# Patient Record
Sex: MALE | Race: WHITE | NOT HISPANIC OR LATINO | Employment: FULL TIME | URBAN - METROPOLITAN AREA
[De-identification: names, ages, dates, MRNs, and addresses within clinical notes are randomized per-mention and may not be internally consistent; named-entity substitution may affect disease eponyms.]

---

## 2024-02-29 ENCOUNTER — OFFICE VISIT (OUTPATIENT)
Dept: PRIMARY CARE | Facility: HOSPITAL | Age: 39
End: 2024-02-29
Payer: COMMERCIAL

## 2024-02-29 VITALS
OXYGEN SATURATION: 96 % | WEIGHT: 214.9 LBS | HEART RATE: 64 BPM | HEIGHT: 74 IN | SYSTOLIC BLOOD PRESSURE: 120 MMHG | DIASTOLIC BLOOD PRESSURE: 80 MMHG | BODY MASS INDEX: 27.58 KG/M2 | TEMPERATURE: 97.9 F

## 2024-02-29 DIAGNOSIS — Z80.42 FAMILY HISTORY OF PROSTATE CANCER: ICD-10-CM

## 2024-02-29 DIAGNOSIS — Z00.00 ROUTINE HEALTH MAINTENANCE: Primary | ICD-10-CM

## 2024-02-29 DIAGNOSIS — D22.9 BENIGN MOLE: ICD-10-CM

## 2024-02-29 PROCEDURE — 1036F TOBACCO NON-USER: CPT | Performed by: INTERNAL MEDICINE

## 2024-02-29 PROCEDURE — 3008F BODY MASS INDEX DOCD: CPT | Performed by: INTERNAL MEDICINE

## 2024-02-29 PROCEDURE — 99396 PREV VISIT EST AGE 40-64: CPT | Performed by: INTERNAL MEDICINE

## 2024-02-29 RX ORDER — LORATADINE 10 MG/1
10 TABLET ORAL DAILY
COMMUNITY

## 2024-02-29 ASSESSMENT — ENCOUNTER SYMPTOMS
DEPRESSION: 0
LOSS OF SENSATION IN FEET: 0
OCCASIONAL FEELINGS OF UNSTEADINESS: 0

## 2024-02-29 NOTE — PATIENT INSTRUCTIONS
Fasting blood work     Follow up with dermatology     Exercise for 30 min 5d per week     Do not sit for more than one hour at a time    Plant based,whole food diet     5 servings of fruits per day, 35 grams of fiber per day     Avoid - red meat, processed meat, saturated fat, sugar     Movies:  The Game Changers  What the Health  Grove [employer]  Blue Zones     Web Sites/Recipes:  Blue Zones  Gro[place] Over [employer]  Plant Layer[employer] TV   Nutritionfacts.org     Authors:  Dr. Abundio Norton     Cookbooks:  The Get Healthy, Go Vegan Cookbook: 125 Easy and Delicious Recipes to Jump-Start Weight Loss and Help you Feel Great - Dr. Abundio Guzman's Cookbook for Reversing Diabetes:  150 Recipes Scientifically Proven to Reverse Diabetes Without Drugs - Dr. Abundio Guzman  The Prevent and Reverse Heart Disease Cookbook - Dr. German Espinoza   The [place] Study All-Star Collection:  Whole Food, Plant-Based Recipes from Your Favorite Vegan  - Dr. FRENCH Polanco  How Not To Die - Dr. Chandler Gonzalez

## 2024-02-29 NOTE — PROGRESS NOTES
Chief Complaint   Patient presents with    Establish Care         History Of Present Illness:    Tobias Pelaez is a 38 y.o. male presenting establish care, update health maintenance and arrange dermatology referral.  Tobias has not seen her doctor recently.  He is in his usual state of health but interested in updating health maintenance.  He has a couple of moles he like to have checked by the dermatologist.  Feeling well in general.  Mood and energy level are good.  He denies headaches dizziness or vision changes.  He endorses good exercise tolerance and denies chest pain or shortness of breath with exertion.  He denies any change in bowel or bladder habits.  Erections are normal.  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  He denies other joint pain or red or swollen joints.      Active Medical Problems:  Patient Active Problem List    Diagnosis Date Noted    Mitral valve prolapse 03/01/2024    Seasonal allergies 03/01/2024    Routine health maintenance 03/01/2024       Past Medical History:  Past Medical History:   Diagnosis Date    Mitral valve prolapse     discovered in childhood,no progression on serial echo and no murmur as an adult    Seasonal allergies        Past Surgical History:  Past Surgical History:   Procedure Laterality Date    WISDOM TOOTH EXTRACTION           Social History:  Social History     Tobacco Use    Smoking status: Never    Smokeless tobacco: Never   Vaping Use    Vaping Use: Never used   Substance Use Topics    Alcohol use: Yes     Alcohol/week: 10.0 standard drinks of alcohol     Types: 10 Standard drinks or equivalent per week     Comment: 1 drink most days - cocktail or wine    Drug use: Never         Family History:  Family History   Problem Relation Name Age of Onset    No Known Problems Mother      Skin cancer Father          melanoma    Prostate cancer Father          Dx age 60    No Known Problems Brother haf     Epilepsy Daughter    "   No Known Problems Daughter          Allergies:  Penicillins    Outpatient Medications:    Current Outpatient Medications:     loratadine (Claritin) 10 mg tablet, Take 1 tablet (10 mg) by mouth once daily., Disp: , Rfl:     PROTEIN SUPPLEMENT ORAL, Take by mouth., Disp: , Rfl:     Review of Systems:  Pertinent positives in review of systems outlined above.  Complete ROS otherwise negative.        Last Recorded Vitals:  Vitals:    02/29/24 1458 02/29/24 1530   BP: (!) 128/93 120/80   BP Location: Left arm    Patient Position: Sitting    BP Cuff Size: Large adult    Pulse: 64    Temp: 36.6 °C (97.9 °F)    SpO2: 96%    Weight: 97.5 kg (214 lb 14.4 oz)    Height: 1.88 m (6' 2\")    Body mass index is 27.59 kg/m².        Physical Exam  Vitals reviewed.   Constitutional:       Appearance: Normal appearance.   HENT:      Mouth/Throat:      Pharynx: Oropharynx is clear.   Eyes:      Extraocular Movements: Extraocular movements intact.      Conjunctiva/sclera: Conjunctivae normal.      Pupils: Pupils are equal, round, and reactive to light.   Neck:      Vascular: No carotid bruit.   Cardiovascular:      Rate and Rhythm: Normal rate and regular rhythm.   Pulmonary:      Effort: Pulmonary effort is normal.      Breath sounds: Normal breath sounds.   Abdominal:      General: Abdomen is flat. Bowel sounds are normal.      Palpations: Abdomen is soft. There is no mass.      Tenderness: There is no abdominal tenderness. There is no right CVA tenderness or left CVA tenderness.   Musculoskeletal:      Cervical back: No tenderness.      Right lower leg: No edema.      Left lower leg: No edema.   Lymphadenopathy:      Cervical: No cervical adenopathy.   Neurological:      General: No focal deficit present.      Mental Status: He is alert and oriented to person, place, and time.   Psychiatric:         Mood and Affect: Mood normal.          Assessment/Plan   Problem List Items Addressed This Visit       Routine health maintenance     " Pressure is in a good range, fasting lipid profile and fasting blood sugar will be obtained now.  A baseline PSA will be included with his blood work.  Colon cancer screening to begin at age 45.  Referral to dermatology for routine mole check placed.  Charge.  Tdap up-to-date.  Advised on testicular self-examination.    We discussed that he can improve his overall health and lower cardiovascular risk by making healthy lifestyle changes including exercising for 30 minutes 5 days/week, eating a plant-based whole food diet, getting 7 hours of restful sleep and managing stress.  Online resources to help with meal planning.          Other Visit Diagnoses       Benign mole    -  Primary    Relevant Orders    Referral to Dermatology    Comprehensive Metabolic Panel (Completed)    Family history of prostate cancer        Relevant Orders    Comprehensive Metabolic Panel (Completed)    Prostate Specific Antigen    BMI 27.0-27.9,adult        Relevant Orders    Comprehensive Metabolic Panel (Completed)    Lipid Panel (Completed)                  Arcadio Nice MD

## 2024-03-01 ENCOUNTER — LAB (OUTPATIENT)
Dept: LAB | Facility: LAB | Age: 39
End: 2024-03-01
Payer: COMMERCIAL

## 2024-03-01 DIAGNOSIS — E78.2 MIXED DYSLIPIDEMIA: Primary | ICD-10-CM

## 2024-03-01 DIAGNOSIS — Z80.42 FAMILY HISTORY OF PROSTATE CANCER: ICD-10-CM

## 2024-03-01 DIAGNOSIS — D22.9 BENIGN MOLE: ICD-10-CM

## 2024-03-01 PROBLEM — Z00.00 ROUTINE HEALTH MAINTENANCE: Status: ACTIVE | Noted: 2024-03-01

## 2024-03-01 PROBLEM — J30.2 SEASONAL ALLERGIES: Status: ACTIVE | Noted: 2024-03-01

## 2024-03-01 PROBLEM — I34.1 MITRAL VALVE PROLAPSE: Status: ACTIVE | Noted: 2024-03-01

## 2024-03-01 LAB
ALBUMIN SERPL BCP-MCNC: 4.5 G/DL (ref 3.4–5)
ALP SERPL-CCNC: 52 U/L (ref 33–120)
ALT SERPL W P-5'-P-CCNC: 33 U/L (ref 10–52)
ANION GAP SERPL CALC-SCNC: 15 MMOL/L (ref 10–20)
AST SERPL W P-5'-P-CCNC: 23 U/L (ref 9–39)
BILIRUB SERPL-MCNC: 0.8 MG/DL (ref 0–1.2)
BUN SERPL-MCNC: 20 MG/DL (ref 6–23)
CALCIUM SERPL-MCNC: 9.3 MG/DL (ref 8.6–10.3)
CHLORIDE SERPL-SCNC: 101 MMOL/L (ref 98–107)
CHOLEST SERPL-MCNC: 236 MG/DL (ref 0–199)
CHOLESTEROL/HDL RATIO: 5.1
CO2 SERPL-SCNC: 28 MMOL/L (ref 21–32)
CREAT SERPL-MCNC: 1.03 MG/DL (ref 0.5–1.3)
EGFRCR SERPLBLD CKD-EPI 2021: >90 ML/MIN/1.73M*2
GLUCOSE SERPL-MCNC: 82 MG/DL (ref 74–99)
HDLC SERPL-MCNC: 46 MG/DL
LDLC SERPL CALC-MCNC: 159 MG/DL
NON HDL CHOLESTEROL: 190 MG/DL (ref 0–149)
POTASSIUM SERPL-SCNC: 4.8 MMOL/L (ref 3.5–5.3)
PROT SERPL-MCNC: 7.5 G/DL (ref 6.4–8.2)
PSA SERPL-MCNC: 0.79 NG/ML
SODIUM SERPL-SCNC: 139 MMOL/L (ref 136–145)
TRIGL SERPL-MCNC: 156 MG/DL (ref 0–149)
VLDL: 31 MG/DL (ref 0–40)

## 2024-03-01 PROCEDURE — 80053 COMPREHEN METABOLIC PANEL: CPT

## 2024-03-01 PROCEDURE — 80061 LIPID PANEL: CPT

## 2024-03-01 PROCEDURE — 84153 ASSAY OF PSA TOTAL: CPT

## 2024-03-01 PROCEDURE — 36415 COLL VENOUS BLD VENIPUNCTURE: CPT

## 2024-03-01 NOTE — ASSESSMENT & PLAN NOTE
Pressure is in a good range, fasting lipid profile and fasting blood sugar will be obtained now.  A baseline PSA will be included with his blood work.  Colon cancer screening to begin at age 43.  Referral to dermatology for routine mole check placed.  Charge.  Tdap up-to-date.  Advised on testicular self-examination.    We discussed that he can improve his overall health and lower cardiovascular risk by making healthy lifestyle changes including exercising for 30 minutes 5 days/week, eating a plant-based whole food diet, getting 7 hours of restful sleep and managing stress.  Online resources to help with meal planning.

## 2024-03-08 ENCOUNTER — OFFICE VISIT (OUTPATIENT)
Dept: DERMATOLOGY | Facility: CLINIC | Age: 39
End: 2024-03-08
Payer: COMMERCIAL

## 2024-03-08 DIAGNOSIS — D22.5 MELANOCYTIC NEVUS OF TRUNK: ICD-10-CM

## 2024-03-08 DIAGNOSIS — L81.4 LENTIGO: ICD-10-CM

## 2024-03-08 DIAGNOSIS — D18.01 HEMANGIOMA OF SKIN: ICD-10-CM

## 2024-03-08 DIAGNOSIS — D48.5 NEOPLASM OF UNCERTAIN BEHAVIOR OF SKIN: Primary | ICD-10-CM

## 2024-03-08 DIAGNOSIS — L57.8 DIFFUSE PHOTODAMAGE OF SKIN: ICD-10-CM

## 2024-03-08 DIAGNOSIS — L21.9 SEBORRHEIC DERMATITIS: ICD-10-CM

## 2024-03-08 PROCEDURE — 11306 SHAVE SKIN LESION 0.6-1.0 CM: CPT | Performed by: DERMATOLOGY

## 2024-03-08 PROCEDURE — 11301 SHAVE SKIN LESION 0.6-1.0 CM: CPT | Performed by: DERMATOLOGY

## 2024-03-08 PROCEDURE — 3008F BODY MASS INDEX DOCD: CPT | Performed by: DERMATOLOGY

## 2024-03-08 PROCEDURE — 88305 TISSUE EXAM BY PATHOLOGIST: CPT | Performed by: DERMATOLOGY

## 2024-03-08 PROCEDURE — 99204 OFFICE O/P NEW MOD 45 MIN: CPT | Performed by: DERMATOLOGY

## 2024-03-08 PROCEDURE — 1036F TOBACCO NON-USER: CPT | Performed by: DERMATOLOGY

## 2024-03-08 ASSESSMENT — ITCH NUMERIC RATING SCALE: HOW SEVERE IS YOUR ITCHING?: 0

## 2024-03-08 ASSESSMENT — DERMATOLOGY PATIENT ASSESSMENT
DO YOU HAVE ANY NEW OR CHANGING LESIONS: NO
DO YOU USE SUNSCREEN: OCCASIONALLY
DO YOU USE A TANNING BED: NO
ARE YOU AN ORGAN TRANSPLANT RECIPIENT: NO

## 2024-03-08 ASSESSMENT — DERMATOLOGY QUALITY OF LIFE (QOL) ASSESSMENT: ARE THERE EXCLUSIONS OR EXCEPTIONS FOR THE QUALITY OF LIFE ASSESSMENT: NO

## 2024-03-09 RX ORDER — KETOCONAZOLE 20 MG/G
CREAM TOPICAL
Qty: 60 G | Refills: 11 | Status: SHIPPED | OUTPATIENT
Start: 2024-03-09

## 2024-03-10 NOTE — PROGRESS NOTES
Subjective     Tobias Pelaez is a 38 y.o. male who presents for the following: Skin Check.  He states he has not noticed any changes in any of his brown spots recently, including in size, shape, or color, and they are all asymptomatic with no associated bleeding, itching, or pain.  He notes dry, scaly areas on his face, especially around his nose and on his inner cheeks.      Review of Systems:  No other skin or systemic complaints other than what is documented elsewhere in the note.    The following portions of the chart were reviewed this encounter and updated as appropriate:       Skin Cancer History  No skin cancer on file.    Specialty Problems    None      Past Dermatologic / Past Relevant Medical History:    No history of atypical nevi or skin cancer    Family History:    Father - skin cancer of unknown type    Social History:    The patient states he is originally from [facility] and went to [employer] for undergraduate school and then [employer] for law school and now works as a     Allergies:  Penicillins    Current Medications / CAM's:    Current Outpatient Medications:     loratadine (Claritin) 10 mg tablet, Take 1 tablet (10 mg) by mouth once daily., Disp: , Rfl:     PROTEIN SUPPLEMENT ORAL, Take by mouth., Disp: , Rfl:     ketoconazole (NIZOral) 2 % cream, Apply twice daily to affected areas of face, Disp: 60 g, Rfl: 11     Objective   Well appearing patient in no apparent distress; mood and affect are within normal limits.    A full examination was performed including scalp, face, eyes, ears, nose, lips, neck, chest, axillae, abdomen, back, bilateral upper extremities, and bilateral lower extremities. All findings within normal limits unless otherwise noted below.    Assessment/Plan   1. Neoplasm of uncertain behavior of skin (2)  Right Lateral Posterior Neck  6 mm dark brown pigmented, asymmetric macule with an asymmetric pigment network and irregular borders           Shave  removal    Lesion length (cm):  0.6  Margin per side (cm):  0.2  Lesion diameter (cm):  1  Informed consent: discussed and consent obtained    Timeout: patient name, date of birth, surgical site, and procedure verified    Procedure prep:  Patient was prepped and draped  Anesthesia: the lesion was anesthetized in a standard fashion    Anesthetic:  1% lidocaine w/ epinephrine 1-100,000 local infiltration  Instrument used: flexible razor blade    Hemostasis achieved with: aluminum chloride    Outcome: patient tolerated procedure well    Post-procedure details: sterile dressing applied and wound care instructions given    Dressing type: bandage and petrolatum      Staff Communication: Dermatology Local Anesthesia: 1 % Lidocaine / Epinephrine - Amount:0.5ml    Specimen 1 - Dermatopathology- DERM LAB  Differential Diagnosis: DN  Check Margins Yes/No?:    Comments:    Dermpath Lab: Routine Histopathology (formalin-fixed tissue)    Left Posterior Shoulder  6 mm dark brown pigmented, asymmetric macule with an asymmetric pigment network and irregular borders           Shave removal    Lesion length (cm):  0.6  Margin per side (cm):  0.2  Lesion diameter (cm):  1  Informed consent: discussed and consent obtained    Timeout: patient name, date of birth, surgical site, and procedure verified    Procedure prep:  Patient was prepped and draped  Anesthesia: the lesion was anesthetized in a standard fashion    Anesthetic:  1% lidocaine w/ epinephrine 1-100,000 local infiltration  Instrument used: flexible razor blade    Hemostasis achieved with: aluminum chloride    Outcome: patient tolerated procedure well    Post-procedure details: sterile dressing applied and wound care instructions given    Dressing type: bandage and petrolatum      Staff Communication: Dermatology Local Anesthesia: 1 % Lidocaine / Epinephrine - Amount:0.5ml    Specimen 2 - Dermatopathology- DERM LAB  Differential Diagnosis: DN  Check Margins Yes/No?:     Comments:    Dermpath Lab: Routine Histopathology (formalin-fixed tissue)    2. Melanocytic nevus of trunk  Scattered on the patient's face, neck, trunk, and extremities, there are multiple small, round- to oval-shaped, brown-pigmented and pink-colored, symmetric, uniform-appearing macules and dome-shaped papules    Clinically benign- to slightly atypical-appearing nevi - the clinically benign- to slightly atypical-appearing nature of the remainder of the patient's nevi was discussed with the patient today.  None of the patient's nevi, with the exception of the 2 noted above, meet threshold for biopsy today.  I emphasized the importance of performing monthly self-skin exams using the ABCDs of monitoring moles, which were reviewed with the patient today and an informational hand-out provided.  I also emphasized the importance of sun avoidance and sun protection with daily sunscreen use.    3. Lentigo  Photodistributed  Multiple tan- to light brown-colored, round- to oval-shaped, symmetric and uniform-appearing macules and small patches consistent with lentigines scattered in sun-exposed areas.    Solar Lentigines and photodamage.  The clinically benign-appearing nature of these lesions and their relation to chronic sun exposure were discussed with the patient today and reassurance provided.  None of these lesions meet threshold for biopsy today, and thus no treatment is medically indicated for these lesions at this time.  The signs and symptoms of skin cancer were reviewed and the patient was advised to practice sun protection and sun avoidance, use daily sunscreen, and perform regular self skin exams.  The patient was instructed to monitor these lesions for any changes, such as in size, shape, or color, or associated symptoms and to call our office to schedule a return visit for re-evaluation if any such changes or symptoms are noticed in the future.  The patient expressed understanding and is in agreement with  this plan.    4. Hemangioma of skin  Scattered on the patient's face, neck, trunk, and extremities, there are multiple small, round, cherry red- to purplish-colored, symmetric, uniform, vascular-appearing macules and papules    Cherry Angiomas - the benign nature of these vascular lesions was discussed with the patient today and reassurance provided.  No treatment is medically indicated for these lesions at this time.    5. Seborrheic dermatitis  Head - Anterior (Face)  On the patient's face, mainly the glabella and bilateral eyebrows and perinasal creases, there are pink, scaly patches with whitish-yellowish, greasy scale    Seborrheic Dermatitis - face.  The potentially chronic and intermittently flaring nature of this condition and treatment options were discussed extensively with the patient today.  At this time, I recommend topical anti-fungal therapy with Ketoconazole 2% cream, which the patient was instructed to apply twice daily to the affected areas of the face.  The risks, benefits, and side effects of this medication were discussed.  The patient expressed understanding and is in agreement with this plan.    ketoconazole (NIZOral) 2 % cream - Head - Anterior (Face)  Apply twice daily to affected areas of face    6. Diffuse photodamage of skin  Photodistributed  Diffuse photodamage with actinic changes with telangiectasia and mottled pigmentation in sun-exposed areas.    Photodamage.  The signs and symptoms of skin cancer were reviewed and the patient was advised to practice sun protection and sun avoidance, use daily sunscreen, and perform regular self skin exams.  Sun protection was discussed, including avoiding the mid-day sun, wearing a sunscreen with SPF at least 50, and stressing the need for reapplication of sunscreen and applying more than they think they need.

## 2024-03-12 LAB
LABORATORY COMMENT REPORT: NORMAL
PATH REPORT.FINAL DX SPEC: NORMAL
PATH REPORT.GROSS SPEC: NORMAL
PATH REPORT.MICROSCOPIC SPEC OTHER STN: NORMAL
PATH REPORT.RELEVANT HX SPEC: NORMAL
PATH REPORT.TOTAL CANCER: NORMAL

## 2024-07-09 ENCOUNTER — OFFICE VISIT (OUTPATIENT)
Dept: PRIMARY CARE | Facility: HOSPITAL | Age: 39
End: 2024-07-09
Payer: COMMERCIAL

## 2024-07-09 ENCOUNTER — APPOINTMENT (OUTPATIENT)
Dept: PRIMARY CARE | Facility: HOSPITAL | Age: 39
End: 2024-07-09
Payer: COMMERCIAL

## 2024-07-09 VITALS
HEART RATE: 50 BPM | TEMPERATURE: 97.2 F | OXYGEN SATURATION: 98 % | SYSTOLIC BLOOD PRESSURE: 133 MMHG | DIASTOLIC BLOOD PRESSURE: 88 MMHG | WEIGHT: 194.9 LBS | BODY MASS INDEX: 25.02 KG/M2

## 2024-07-09 DIAGNOSIS — R19.09 LUMP IN THE GROIN: Primary | ICD-10-CM

## 2024-07-09 PROCEDURE — 3008F BODY MASS INDEX DOCD: CPT | Performed by: INTERNAL MEDICINE

## 2024-07-09 PROCEDURE — 1036F TOBACCO NON-USER: CPT | Performed by: INTERNAL MEDICINE

## 2024-07-09 PROCEDURE — 99213 OFFICE O/P EST LOW 20 MIN: CPT | Performed by: INTERNAL MEDICINE

## 2024-07-09 NOTE — ASSESSMENT & PLAN NOTE
Palpable lump in left groin is consistent with a reactive lymph node versus small lipoma.  There is no other adenopathy in the left groin, there is no adenopathy in the right groin, there is no axillary or cervical adenopathy on exam.  There is no splenomegaly on exam.  He has no constitutional symptoms.  An ultrasound of the left groin will be arranged to differentiate lipoma from reactive lymph node.  Tobias will call if the lump increases in size, becomes painful or if he develops constitutional symptoms.

## 2024-07-09 NOTE — PATIENT INSTRUCTIONS
Schedule ultrasound of pelvis     Call for increased size, pain, swelling in other parts of the body or fevers/chills/sweats

## 2024-07-09 NOTE — PROGRESS NOTES
Chief Complaint:   Follow-up     History Of Present Illness:    Tobias Pelaez is a 38 y.o. male with active medical problems outlined below who presents for evaluation and management of a lump in left groin.    Tobias discovered a lump in his left groin incidentally while showering over the past couple of months.  He describes it as in the fold between his leg and his torso.  Is more prominent when he stands and less prominent when he lies back.  It is not painful.  There is no drainage.  He denies any new urinary symptoms.  He denies any new skin lesions on his penis scrotum or groin.  He denies swelling elsewhere in his body.  He denies fevers chills or sweats at night.  He is otherwise in his usual state of health.       Patient Active Problem List   Diagnosis    Mitral valve prolapse    Seasonal allergies    Routine health maintenance    Lump in the groin       Current Outpatient Medications:     ketoconazole (NIZOral) 2 % cream, Apply twice daily to affected areas of face, Disp: 60 g, Rfl: 11    loratadine (Claritin) 10 mg tablet, Take 1 tablet (10 mg) by mouth once daily., Disp: , Rfl:     PROTEIN SUPPLEMENT ORAL, Take by mouth., Disp: , Rfl:   Penicillins  Social History     Tobacco Use    Smoking status: Never    Smokeless tobacco: Never   Vaping Use    Vaping status: Never Used   Substance Use Topics    Alcohol use: Yes     Alcohol/week: 10.0 standard drinks of alcohol     Types: 10 Standard drinks or equivalent per week     Comment: 1 drink most days - cocktail or wine    Drug use: Never         All pertinent aspects of medical and surgical history were reviewed and updated in chart    Review of Systems   Pertinent positives in review of systems outlined above.  Complete ROS otherwise negative.        Last Recorded Vitals:  Patient Vitals for the past 24 hrs:   BP Temp Pulse SpO2 Weight   07/09/24 0821 133/88 36.2 °C (97.2 °F) 50 98 % 88.4 kg (194 lb 14.4 oz)          Physical Exam  HENT:       Mouth/Throat:      Pharynx: Oropharynx is clear.   Eyes:      Extraocular Movements: Extraocular movements intact.      Conjunctiva/sclera: Conjunctivae normal.      Pupils: Pupils are equal, round, and reactive to light.   Cardiovascular:      Rate and Rhythm: Normal rate and regular rhythm.   Abdominal:      General: Abdomen is flat.      Palpations: There is no mass.      Hernia: No hernia is present.   Genitourinary:     Comments: 3 to 4 cm palpable nodule on left groin.  Nodule is well-circumscribed, soft and mobile.  No overlying erythema.  No central fluctuance.  No inguinal hernia present.  No swelling in the right groin.  Musculoskeletal:      Comments: No adenopathy in right or left axilla.          The 10-year ASCVD risk score (Lukasz DK, et al., 2019) is: 1.9%    Values used to calculate the score:      Age: 38 years      Sex: Male      Is Non- : No      Diabetic: No      Tobacco smoker: No      Systolic Blood Pressure: 133 mmHg      Is BP treated: No      HDL Cholesterol: 46 mg/dL      Total Cholesterol: 236 mg/dL      Assessment/Plan   Problem List Items Addressed This Visit       Lump in the groin - Primary     Palpable lump in left groin is consistent with a reactive lymph node versus small lipoma.  There is no other adenopathy in the left groin, there is no adenopathy in the right groin, there is no axillary or cervical adenopathy on exam.  There is no splenomegaly on exam.  He has no constitutional symptoms.  An ultrasound of the left groin will be arranged to differentiate lipoma from reactive lymph node.  Tobias will call if the lump increases in size, becomes painful or if he develops constitutional symptoms.         Relevant Orders    US pelvis limited       A total of 20 minutes was spent reviewing the chart and recent testing and discussing plan of care.         Arcadio Nice MD

## 2024-07-10 ENCOUNTER — HOSPITAL ENCOUNTER (OUTPATIENT)
Dept: RADIOLOGY | Facility: HOSPITAL | Age: 39
Discharge: HOME | End: 2024-07-10
Payer: COMMERCIAL

## 2024-07-10 DIAGNOSIS — I72.4 PSEUDOANEURYSM OF FEMORAL ARTERY (CMS-HCC): Primary | ICD-10-CM

## 2024-07-10 DIAGNOSIS — R19.09 LUMP IN THE GROIN: ICD-10-CM

## 2024-07-10 PROCEDURE — 76857 US EXAM PELVIC LIMITED: CPT

## 2024-07-10 NOTE — PROGRESS NOTES
US results reviewed with radiology.  CTA ordered.  I spoke with John and advised ER eval for increase in size of swelling, pain in leg or back or if foot becomes numb, cold , blue.  To follow up with vascular surgery.  Referral placed.

## 2024-07-11 ENCOUNTER — HOSPITAL ENCOUNTER (OUTPATIENT)
Dept: RADIOLOGY | Facility: HOSPITAL | Age: 39
Discharge: HOME | End: 2024-07-11
Payer: COMMERCIAL

## 2024-07-11 DIAGNOSIS — I72.4 PSEUDOANEURYSM OF FEMORAL ARTERY (CMS-HCC): ICD-10-CM

## 2024-07-11 LAB
CREAT SERPL-MCNC: 0.46 MG/DL (ref 0.6–1.3)
GFR SERPL CREATININE-BSD FRML MDRD: >90 ML/MIN/1.73M*2

## 2024-07-11 PROCEDURE — 2550000001 HC RX 255 CONTRASTS: Performed by: INTERNAL MEDICINE

## 2024-07-11 PROCEDURE — 82565 ASSAY OF CREATININE: CPT

## 2024-07-11 PROCEDURE — 72191 CT ANGIOGRAPH PELV W/O&W/DYE: CPT | Mod: 50

## 2024-07-11 PROCEDURE — 73706 CT ANGIO LWR EXTR W/O&W/DYE: CPT | Mod: 50,LT

## 2024-07-16 ENCOUNTER — TELEPHONE (OUTPATIENT)
Dept: PRIMARY CARE | Facility: HOSPITAL | Age: 39
End: 2024-07-16
Payer: COMMERCIAL

## 2024-07-16 DIAGNOSIS — I72.9 PSEUDOANEURYSM (CMS-HCC): Primary | ICD-10-CM

## 2024-07-16 NOTE — TELEPHONE ENCOUNTER
I reviewed recent ultrasound and CT angiogram results with vascular.  Dr. Jessica Lal reviewed the studies and suspects that there is a pseudoaneurysm that has clotted.  She recommends an arterial Doppler done in the vascular lab specifically to rule out pseudoaneurysm.  I placed a stat order, and I spoke with Tobias about scheduling the test.  I advised him to limit physical exertion until his follow-up study has been reviewed.  He is not having pain in his groin, and he has not experienced any increase in the size of the swelling.

## 2024-07-17 ENCOUNTER — HOSPITAL ENCOUNTER (OUTPATIENT)
Dept: VASCULAR MEDICINE | Facility: HOSPITAL | Age: 39
Discharge: HOME | End: 2024-07-17
Payer: COMMERCIAL

## 2024-07-17 DIAGNOSIS — I72.9 PSEUDOANEURYSM (CMS-HCC): ICD-10-CM

## 2024-07-17 DIAGNOSIS — I73.9 PERIPHERAL VASCULAR DISEASE, UNSPECIFIED (CMS-HCC): ICD-10-CM

## 2024-07-17 PROCEDURE — 93926 LOWER EXTREMITY STUDY: CPT | Performed by: INTERNAL MEDICINE

## 2024-07-17 PROCEDURE — 93926 LOWER EXTREMITY STUDY: CPT

## 2024-07-19 ENCOUNTER — PATIENT MESSAGE (OUTPATIENT)
Dept: PRIMARY CARE | Facility: CLINIC | Age: 39
End: 2024-07-19
Payer: COMMERCIAL

## 2024-07-22 ENCOUNTER — APPOINTMENT (OUTPATIENT)
Dept: VASCULAR SURGERY | Facility: CLINIC | Age: 39
End: 2024-07-22
Payer: COMMERCIAL

## 2024-07-22 VITALS
OXYGEN SATURATION: 96 % | BODY MASS INDEX: 24.13 KG/M2 | WEIGHT: 188 LBS | HEIGHT: 74 IN | HEART RATE: 60 BPM | DIASTOLIC BLOOD PRESSURE: 66 MMHG | SYSTOLIC BLOOD PRESSURE: 118 MMHG

## 2024-07-22 DIAGNOSIS — I87.2 VENOUS INSUFFICIENCY (CHRONIC) (PERIPHERAL): ICD-10-CM

## 2024-07-22 DIAGNOSIS — I72.4 PSEUDOANEURYSM OF FEMORAL ARTERY (CMS-HCC): ICD-10-CM

## 2024-07-22 DIAGNOSIS — I83.893 SYMPTOMATIC VARICOSE VEINS OF BOTH LOWER EXTREMITIES: Primary | ICD-10-CM

## 2024-07-22 PROCEDURE — 99204 OFFICE O/P NEW MOD 45 MIN: CPT | Performed by: NURSE PRACTITIONER

## 2024-07-22 PROCEDURE — 3008F BODY MASS INDEX DOCD: CPT | Performed by: NURSE PRACTITIONER

## 2024-07-22 PROCEDURE — 1036F TOBACCO NON-USER: CPT | Performed by: NURSE PRACTITIONER

## 2024-07-22 NOTE — PROGRESS NOTES
"  Tobias Pelaez \"Gordo" is a 38 y.o. male     Subjective   This is a 38-year-old new patient to this office who comes for evaluation treatment of a soft mass left groin.  Patient also has extensive bilateral varicose veins medially from the calf to the thigh left greater than right with reticular veins and telangiectasia.  Patient's mass is nonpulsatile soft able to compress without pain.  Patient's varicose veins have increased in size for the past 2 years patient also has had in the last 6 months to a year 30 pounds of weight loss through dieting.  He is also exercising 3-5 times a week.  Patient's symptoms of his varicose veins include achiness and tiredness of his lower extremities.  Patient has a occupation requiring sitting for prolonged periods of time.  Patient is 6 feet 2 inches tall weighs 188 pounds.  He did smoke for 2 years in college.    PMHx and PSHx mitral valve prolapse, former smoker     Patient denies any fevers, chills, night sweats, nausea, vomiting,  diarrhea, joint pains or swelling.  Patient did have a recent weight loss of 30 pounds by diet, patient denies any joint pain.  Visual disturbances or dizziness.    Review of Systems A 10 point ROS was performed with the patient denying any complaint at this time aside from those listed in the HPI above.  OBJECTIVE    Vitals:    07/22/24 1300   BP: 118/66   Pulse: 60   SpO2: 96%      Physical Exam  Constitutional: Well developed , awake/alert/oriented x3, in no distress,  Eyes: Clear sclera  ENMT: mucous membranes are moist, no apparent injury, no lesions seen,   Head/neck: Neck supple, trachea  is midline, no apparent injury, no bruits, no mass, no stridor  Respiratory/thorax: Breath sounds clear and equal bilaterally with good chest expansion, thorax symmetric  Cardiac/Vascular: Regular, rate and rhythm, no murmurs, 2+ radial pulses, palpable bilateral femorals, popliteals, posterior tib's, multiphasic dopplerable signals to DP and " PTs  Gastrointestinal: Nondistended soft nontender, positive bowel sounds, no bruits.  Musculoskeletal: Moves all extremities, limited range of motion , no joint swelling,   Extremities: Prominent varicose veins bilateral lower extremities left greater than right with reticular veins and telangiectasia bilateral ankles, small lump left groin palpable no cyanosis, no contusions or wounds,   Neurological: Alert and oriented x3,   Lymphatic: No significant lymphadenopathy  Skin: Warm and dry, no lesions, no rashes  Psychological: Appropriate mood and behavior    Current Outpatient Medications:     ketoconazole (NIZOral) 2 % cream, Apply twice daily to affected areas of face, Disp: 60 g, Rfl: 11    loratadine (Claritin) 10 mg tablet, Take 1 tablet (10 mg) by mouth once daily., Disp: , Rfl:     PROTEIN SUPPLEMENT ORAL, Take by mouth., Disp: , Rfl:      Lab Results   Component Value Date    WBC 4.7 06/07/2019    HGB 15.3 06/07/2019    HCT 46.4 06/07/2019     06/07/2019    CHOL 236 (H) 03/01/2024    TRIG 156 (H) 03/01/2024    HDL 46.0 03/01/2024    ALT 33 03/01/2024    AST 23 03/01/2024     03/01/2024    K 4.8 03/01/2024     03/01/2024    CREATININE 1.03 03/01/2024    BUN 20 03/01/2024    CO2 28 03/01/2024    TSH 1.80 06/07/2019    PSA 0.79 03/01/2024    HGBA1C 4.9 06/07/2019       Vascular US lower extremity arterial duplex left    Result Date: 7/18/2024            [place] [address]   Tel [phone] and Fax [phone]  Vascular Lab Report VASC US LOWER EXTREMITY ARTERIAL DUPLEX LEFT  Patient Name:      VERA Hammond Physician:  [id number] Jessica Mohamud MD, RPVI Study Date:        7/17/2024             Ordering Physician: [id number] ESCOTO MRN/PID:           [id number]              Technologist:       Geetha Shaw RVITZEL Accession#:        [id number]          Technologist 2: Date  of Birth/Age: 1985 / 38 years Encounter#:         [id number] Gender:            M Admission Status:  Outpatient            Location Performed: [facility]  Diagnosis/ICD: Peripheral vascular disease, unspecified-I73.9 CPT Codes:     63515 Peripheral artery Lower arterial Duplex limited  CONCLUSIONS: Left Lower Venous: There is a dilated vein that appears to be connected to the left proximal great saphenous vein with hyperechoic shadows noted suggestive of thrombus, could represnt a large varicose vein/venous aneurysm but other venous pathology can not be excluded, may wish other means of evalaution. Note, this structure does not communicate with the arterial system. Left prox GSV pre the dilation diam 9.13 mm Left prox GSV diam 13.19 mm long 11.01 mm. Left prox-mid GSV diam 7.56 mm. Pseudo Aneurysm: No evidence of pseudo aneurysm noted in the left groin. There is no evidence of AVF.  Imaging & Doppler Findings:  Left           Compress Thrombus SFJ              Yes      None Prox Thigh GSV   Yes      None  Left        Compress Thrombus        Flow CFV           Yes      None   Spontaneous/Phasic FV Proximal   Yes      None   Spontaneous/Phasic Right               Left  PSV                 PSV           EIA      97 cm/s           CFA      62 cm/s       SFA Proximal 60 cm/s  [id number] Jessica Mohamud MD, RPVI Electronically signed by [id number] Jessica Mohamud MD, RPVI on 7/18/2024 at 10:41:49 AM  ** Final **      Patient's arterial duplex which was performed on 7/17/2024 reveals a dilated vein that appears to be connected to the left proximal greater saphenous vein with hyperechoic shadowing is noted suggestive of thrombus could be present a large varicose vein/venous aneurysm but there is no arterial communication or structure that replant pseudoaneurysm of the left groin.  Assessment/Plan     Symptomatic varicose veins of both lower  extremities  Venous insufficiency  Patient's left groin mass is compressible without pain.  According to venous duplex most likely a varicose vein. Patient does have extensive bilateral lower extremity varicose veins which have increased in size over the last couple years.  After examination patient and practitioner jointly discussed extensively factors that increase or exacerbate venous disease and actions to control, treat, and mitigate symptoms in this disease process: This included weight reduction, decreasing carbohydrate and simple sugar intake in diet, starting an exercise regiment which may include walking, using stationary bike or pedal device on a daily basis. Patient is encouraged to apply compression stockings when the client first gets out of bed in the morning and to wear them daily. Elevations of legs above the heart needs to be performed  several times throughout the day, as well as to minimize sitting or standing for long periods of time. If the patient has  dry skin or stasis dermatitis of the lower extremity they are to use lotions, and to be compliant with cardiovascular medications as ordered.  Patient is advised to avoid any strenuous exercise requiring him to hold his breath.  He may perform and continue current exercises.  CEAP Class: 2    Patient to follow-up with Dr. Yap in 3 months, venous duplex for reflux ordered, patient given prescription for compression stockings and to wear them on a daily basis, patient to start aspirin 81 mg daily    Valdez Cobb, APRN-CNP

## 2024-07-30 ENCOUNTER — APPOINTMENT (OUTPATIENT)
Dept: VASCULAR SURGERY | Facility: CLINIC | Age: 39
End: 2024-07-30
Payer: COMMERCIAL

## 2024-08-28 ENCOUNTER — APPOINTMENT (OUTPATIENT)
Dept: VASCULAR SURGERY | Facility: HOSPITAL | Age: 39
End: 2024-08-28
Payer: COMMERCIAL

## 2024-09-17 ENCOUNTER — HOSPITAL ENCOUNTER (OUTPATIENT)
Dept: VASCULAR MEDICINE | Facility: HOSPITAL | Age: 39
Discharge: HOME | End: 2024-09-17
Payer: COMMERCIAL

## 2024-09-17 DIAGNOSIS — I83.93 ASYMPTOMATIC VARICOSE VEINS OF BILATERAL LOWER EXTREMITIES: ICD-10-CM

## 2024-09-17 DIAGNOSIS — I83.893 SYMPTOMATIC VARICOSE VEINS OF BOTH LOWER EXTREMITIES: ICD-10-CM

## 2024-09-17 PROCEDURE — 93970 EXTREMITY STUDY: CPT

## 2024-09-17 PROCEDURE — 93970 EXTREMITY STUDY: CPT | Performed by: SURGERY

## 2024-10-15 ENCOUNTER — APPOINTMENT (OUTPATIENT)
Dept: VASCULAR SURGERY | Facility: CLINIC | Age: 39
End: 2024-10-15
Payer: COMMERCIAL

## 2025-06-17 ENCOUNTER — APPOINTMENT (OUTPATIENT)
Dept: PRIMARY CARE | Facility: CLINIC | Age: 40
End: 2025-06-17
Payer: COMMERCIAL

## 2025-06-17 VITALS
HEART RATE: 50 BPM | WEIGHT: 167 LBS | SYSTOLIC BLOOD PRESSURE: 122 MMHG | BODY MASS INDEX: 21.43 KG/M2 | TEMPERATURE: 98.9 F | DIASTOLIC BLOOD PRESSURE: 80 MMHG | HEIGHT: 74 IN | OXYGEN SATURATION: 100 %

## 2025-06-17 DIAGNOSIS — R73.9 HYPERGLYCEMIA: ICD-10-CM

## 2025-06-17 DIAGNOSIS — Z51.81 ENCOUNTER FOR MEDICATION MONITORING: ICD-10-CM

## 2025-06-17 DIAGNOSIS — I34.1 MITRAL VALVE PROLAPSE: ICD-10-CM

## 2025-06-17 DIAGNOSIS — I83.893 SYMPTOMATIC VARICOSE VEINS OF BOTH LOWER EXTREMITIES: ICD-10-CM

## 2025-06-17 DIAGNOSIS — Z12.5 ENCOUNTER FOR PROSTATE CANCER SCREENING: ICD-10-CM

## 2025-06-17 DIAGNOSIS — E78.5 DYSLIPIDEMIA: ICD-10-CM

## 2025-06-17 DIAGNOSIS — Z00.00 ROUTINE GENERAL MEDICAL EXAMINATION AT A HEALTH CARE FACILITY: Primary | ICD-10-CM

## 2025-06-17 DIAGNOSIS — Z23 NEED FOR TETANUS, DIPHTHERIA, AND ACELLULAR PERTUSSIS (TDAP) VACCINE: ICD-10-CM

## 2025-06-17 DIAGNOSIS — L21.9 SEBORRHEIC DERMATITIS: ICD-10-CM

## 2025-06-17 DIAGNOSIS — J30.2 SEASONAL ALLERGIES: ICD-10-CM

## 2025-06-17 PROBLEM — R19.09 LUMP IN THE GROIN: Status: RESOLVED | Noted: 2024-07-09 | Resolved: 2025-06-17

## 2025-06-17 PROBLEM — I87.2 VENOUS INSUFFICIENCY (CHRONIC) (PERIPHERAL): Status: RESOLVED | Noted: 2024-07-22 | Resolved: 2025-06-17

## 2025-06-17 PROCEDURE — 90471 IMMUNIZATION ADMIN: CPT | Performed by: STUDENT IN AN ORGANIZED HEALTH CARE EDUCATION/TRAINING PROGRAM

## 2025-06-17 PROCEDURE — 3008F BODY MASS INDEX DOCD: CPT | Performed by: STUDENT IN AN ORGANIZED HEALTH CARE EDUCATION/TRAINING PROGRAM

## 2025-06-17 PROCEDURE — 1036F TOBACCO NON-USER: CPT | Performed by: STUDENT IN AN ORGANIZED HEALTH CARE EDUCATION/TRAINING PROGRAM

## 2025-06-17 PROCEDURE — 99214 OFFICE O/P EST MOD 30 MIN: CPT | Performed by: STUDENT IN AN ORGANIZED HEALTH CARE EDUCATION/TRAINING PROGRAM

## 2025-06-17 PROCEDURE — 99396 PREV VISIT EST AGE 40-64: CPT | Performed by: STUDENT IN AN ORGANIZED HEALTH CARE EDUCATION/TRAINING PROGRAM

## 2025-06-17 PROCEDURE — 90715 TDAP VACCINE 7 YRS/> IM: CPT | Performed by: STUDENT IN AN ORGANIZED HEALTH CARE EDUCATION/TRAINING PROGRAM

## 2025-06-17 ASSESSMENT — ENCOUNTER SYMPTOMS
DEPRESSION: 0
OCCASIONAL FEELINGS OF UNSTEADINESS: 0
ABDOMINAL PAIN: 0
LIGHT-HEADEDNESS: 0
EYE PAIN: 0
DIZZINESS: 0
CHILLS: 0
HEMATURIA: 0
SHORTNESS OF BREATH: 0
UNEXPECTED WEIGHT CHANGE: 0
LOSS OF SENSATION IN FEET: 0
RHINORRHEA: 0
FEVER: 0

## 2025-06-17 ASSESSMENT — PATIENT HEALTH QUESTIONNAIRE - PHQ9
1. LITTLE INTEREST OR PLEASURE IN DOING THINGS: NOT AT ALL
SUM OF ALL RESPONSES TO PHQ9 QUESTIONS 1 AND 2: 0
2. FEELING DOWN, DEPRESSED OR HOPELESS: NOT AT ALL

## 2025-06-17 NOTE — ASSESSMENT & PLAN NOTE
-LDL Goal <100  -Patient is above goal.  - Will monitor lipid panel.  If still elevated above goal, consider statin therapy.

## 2025-06-17 NOTE — PROGRESS NOTES
"Subjective     Patient ID: Tobias Pelaez \"John\" is a 39 y.o. male     Initial PCP history 6/17/2025:  -Patient is here to establish care.  This is the first time meeting the patient.  -Patient will participate in [employer] in August 2025. No syncopal in the past. Daughter had childhood epilepsy-rolands.   -Father had prostate cancer-s/p prostectomy.     Tobacco/Alcohol/Drug Use:   Social History     Tobacco Use    Smoking status: Never     Passive exposure: Never    Smokeless tobacco: Former    Tobacco comments:     Smoked in collage for two years    Substance Use Topics    Alcohol use: Yes     Alcohol/week: 10.0 standard drinks of alcohol     Types: 10 Standard drinks or equivalent per week     Comment: 1 drink most days - cocktail or wine       Required Screenings/Immunizations:   Health Maintenance Due   Topic Date Due    HIV Screening  Never done    Derm Melanoma Skin Check  Never done    MMR Vaccines (1 of 1 - Standard series) Never done    Varicella Vaccines (1 of 2 - 13+ 2-dose series) Never done    Hepatitis C Screening  Never done    Hepatitis B Vaccines (1 of 3 - 19+ 3-dose series) Never done    Pneumococcal Vaccine: Pediatrics and At-Risk Adult Patients (1 of 2 - PCV) Never done       Problems to be addressed today in addition to Preventative Services: As stated in orders.     Review of Systems   Constitutional:  Negative for chills, fever and unexpected weight change.   HENT:  Negative for rhinorrhea.    Eyes:  Negative for pain.   Respiratory:  Negative for shortness of breath.    Cardiovascular:  Negative for chest pain.   Gastrointestinal:  Negative for abdominal pain.   Genitourinary:  Negative for hematuria.   Skin:  Negative for rash.   Neurological:  Negative for dizziness, syncope and light-headedness.   Psychiatric/Behavioral:  Negative for behavioral problems and suicidal ideas.        /80 (BP Location: Left arm, Patient Position: Sitting, BP Cuff Size: Adult)   Pulse 50   Temp 37.2 °C " "(98.9 °F) (Temporal)   Ht 1.88 m (6' 2\")   Wt 75.8 kg (167 lb)   SpO2 100%   BMI 21.44 kg/m²      Objective   Physical Exam  Vitals and nursing note reviewed.   Constitutional:       General: He is not in acute distress.  HENT:      Head: Normocephalic.      Right Ear: External ear normal.      Left Ear: External ear normal.      Nose: No rhinorrhea.      Mouth/Throat:      Mouth: Mucous membranes are moist.   Eyes:      Conjunctiva/sclera: Conjunctivae normal.   Cardiovascular:      Rate and Rhythm: Normal rate and regular rhythm.      Heart sounds: No murmur heard.     No friction rub. No gallop.   Pulmonary:      Effort: No respiratory distress.      Breath sounds: No wheezing, rhonchi or rales.   Abdominal:      General: Bowel sounds are normal. There is no distension.      Palpations: Abdomen is soft.      Tenderness: There is no abdominal tenderness.      Hernia: A hernia is present.   Musculoskeletal:      Cervical back: Neck supple.      Right lower leg: No edema.      Left lower leg: No edema.   Skin:     General: Skin is warm and dry.      Capillary Refill: Capillary refill takes less than 2 seconds.   Neurological:      Mental Status: He is alert.      Gait: Gait normal.           Labs:   Lab Results   Component Value Date    WBC 4.7 06/07/2019    HGB 15.3 06/07/2019    HCT 46.4 06/07/2019     06/07/2019    TSH 1.80 06/07/2019    PSA 0.79 03/01/2024     Lab Results   Component Value Date     03/01/2024    K 4.8 03/01/2024     03/01/2024    BUN 20 03/01/2024    CREATININE 1.03 03/01/2024    GLUCOSE 82 03/01/2024    CALCIUM 9.3 03/01/2024    PROT 7.5 03/01/2024    BILITOT 0.8 03/01/2024    ALKPHOS 52 03/01/2024    AST 23 03/01/2024    ALT 33 03/01/2024     Lab Results   Component Value Date    CHOL 236 (H) 03/01/2024    CHOL 179 06/07/2019      Lab Results   Component Value Date    TRIG 156 (H) 03/01/2024    TRIG 123 06/07/2019      Lab Results   Component Value Date    HDL 46.0 " "03/01/2024    HDL 44.1 06/07/2019     Lab Results   Component Value Date    LDLCALC 159 (H) 03/01/2024      Lab Results   Component Value Date    VLDL 31 03/01/2024    VLDL 25 06/07/2019    No components found for: \"[id number]\"    Imaging/Testing: Vascular US lower extremity venous insufficiency bilateral               [place] [address]    Tel [phone] and Fax [phone]       Vascular Lab Report  VASC US LOWER EXTREMITY VENOUS INSUFFICIENCY BILATERAL       Patient Name:      VERA Hammond Physician:  [id number] Lou Yap MD  Study Date:        9/17/2024             Ordering Physician: [id number] TONNY WILLAMS  MRN/PID:           [id number]              Technologist:       Amos Rodriguez ITZEL  Accession#:        [id number]          Technologist 2:  Date of Birth/Age: 1985 / 38 years Encounter#:         [id number]  Gender:            M  Admission Status:  Outpatient            Location Performed: [facility]       Diagnosis/ICD:    Asymptomatic varicose veins of bilateral lower                    extremities-I83.93  CPT Codes:        07072 Venous reflux study VV VI complete  Patient Position: Study performed in a reverse Trendelenburg position.       CONCLUSIONS:  Right Lower Venous Insufficiency: Reflux is noted in the proximal thigh great saphenous, knee level of great saphenous, proximal calf great saphenous, mid calf great saphenous and distal calf great saphenous veins. There is reflux noted in the common femoral vein.  noted in the right distal calf PTV.  Branching varicosties noted from mid thigh to distal calf.  Right popliteal vein and GSV mid thigh doppler was not obtained due to machine error.  Left Lower Venous Insufficiency: " Reflux is noted in the saphenofemoral junction, proximal thigh great saphenous, mid thigh great saphenous, knee level of great saphenous and proximal calf great saphenous veins. There is reflux noted in the common femoral and mid femoral veins.  noted in the left distal calf PTV.  Branching varicosties noted from left groin to distal calf.  Right Lower Venous: No evidence of acute deep vein thrombus visualized in the right lower extremity.  Left Lower Venous: No evidence of acute deep vein thrombus visualized in the left lower extremity.     Imaging & Doppler Findings:     Right          Compress Thrombus  Diam   Depth    Time  SFJ              Yes      None   6.5 mm 17.7 mm 0.00 sec  Prox Thigh GSV   Yes      None   4.0 mm 9.9 mm  4.40 sec  Mid Thigh GSV    Yes      None   6.4 mm 10.6 mm  Knee GSV         Yes      None   3.7 mm 11.2 mm 3.40 sec  Prox Calf GSV    Yes      None   2.5 mm 6.6 mm  3.30 sec  Mid Calf GSV     Yes      None   2.3 mm 5.4 mm  3.00 sec  Dist Calf GSV    Yes      None   2.3 mm 5.8 mm  1.20 sec  SSV Prox         Yes      None  SSV Mid          Yes      None  SSV Distal       Yes      None       Left           Compress Thrombus  Diam   Depth    Time  SFJ              Yes      None   8.1 mm 13.3 mm 0.80 sec  Prox Thigh GSV   Yes      None   2.8 mm 11.6 mm 3.60 sec  Mid Thigh GSV    Yes      None   3.5 mm 15.5 mm 4.00 sec  Knee GSV         Yes      None   3.3 mm 10.2 mm 1.80 sec  Prox Calf GSV    Yes      None   2.5 mm 7.4 mm  1.40 sec  Mid Calf GSV     Yes      None   2.6 mm 6.2 mm  0.00 sec  Dist Calf GSV    Yes      None   2.2 mm 6.4 mm  0.00 sec  SSV Prox         Yes      None  SSV Mid          Yes      None  SSV Distal       Yes      None       Right                 Compressible Thrombus        Flow  Distal External Iliac     Yes        None  CFV                       Yes        None         Reflux  PFV                       Yes        None  FV Proximal               Yes        None    Spontaneous/Phasic  FV Mid                    Yes        None  FV Distal                 Yes        None  Popliteal                 Yes        None  Peroneal                  Yes        None  PTV                       Yes        None       Left                  Compress Thrombus        Flow  Distal External Iliac   Yes      None  CFV                     Yes      None         Reflux  PFV                     Yes      None  FV Proximal             Yes      None  FV Mid                  Yes      None         Reflux  FV Distal               Yes      None  Popliteal               Yes      None   Spontaneous/Phasic  Peroneal                Yes      None  PTV                     Yes      None       [id number] Lou Yap MD  Electronically signed by [id number] Lou Yap MD on 9/21/2024 at 9:00:38 AM       ** Final **      Problem List Items Addressed This Visit          Medium    Mitral valve prolapse    Overview   discovered in childhood,no progression on serial echo and no murmur as an adult         Seasonal allergies    Current Assessment & Plan   -Controlled. He takes Claritin rarely.          Symptomatic varicose veins of both lower extremities    Current Assessment & Plan   -I reviewed outside vascular surgery notes and diagnostic studies.  -Continue to use compression stockings.  -Patient would like to hold off on surgical intervention at this time.  -Continue to monitor.         Seborrheic dermatitis    Current Assessment & Plan   -I reviewed dermatology provider notes.  -Continue ketoconazole.  Continue to monitor.         Dyslipidemia    Current Assessment & Plan   -LDL Goal <100  -Patient is above goal.  - Will monitor lipid panel.  If still elevated above goal, consider statin therapy.         Relevant Orders    Lipid panel     Other Visit Diagnoses         Routine general medical examination at a health care facility    -  Primary    Relevant Orders    Lipid panel    Comprehensive Metabolic Panel    CBC  and Auto Differential      Encounter for medication monitoring        Relevant Orders    Comprehensive Metabolic Panel    CBC and Auto Differential      Encounter for prostate cancer screening        Relevant Orders    Prostate Specific Antigen, Screen      Hyperglycemia        Relevant Orders    Hemoglobin A1c      Need for tetanus, diphtheria, and acellular pertussis (Tdap) vaccine        Relevant Orders    Tdap vaccine, age 7 years and older (Completed)              As part of today's Preventative Visit, an age and gender-appropriate history and physical was performed, as documented below. Counseling and anticipatory guidance were performed, and risk factor reduction interventions (including [place] Preventative Services Task Force recommended screening tests) were utilized/ordered as outlined in the above Assessment and Plan. All patient medications were reviewed, and refilled if necessary.    Patient and I discussed diet/nutrition, lifestyle modifications, safety, medication indications and side effects, and health goals.    current treatment plan is effective, no change in therapy, orders and follow up as documented in EMR, lab results reviewed with patient, repeat labs ordered prior to next appointment, reviewed compliance with lifestyle measures, reviewed diet, exercise and weight control, reviewed medications and side effects in detail           I have reviewed OARRS report, consistent with prescribed medication. I have considered risks of abuse, diversion, side effects and feel clinically benefit of medication outweighs risks at this time.

## 2025-06-17 NOTE — ASSESSMENT & PLAN NOTE
-I reviewed outside vascular surgery notes and diagnostic studies.  -Continue to use compression stockings.  -Patient would like to hold off on surgical intervention at this time.  -Continue to monitor.

## 2025-06-20 LAB
ALBUMIN SERPL-MCNC: 4.7 G/DL (ref 3.6–5.1)
ALP SERPL-CCNC: 46 U/L (ref 36–130)
ALT SERPL-CCNC: 16 U/L (ref 9–46)
ANION GAP SERPL CALCULATED.4IONS-SCNC: 9 MMOL/L (CALC) (ref 7–17)
AST SERPL-CCNC: 19 U/L (ref 10–40)
BASOPHILS # BLD AUTO: 51 CELLS/UL (ref 0–200)
BASOPHILS NFR BLD AUTO: 1 %
BILIRUB SERPL-MCNC: 0.7 MG/DL (ref 0.2–1.2)
BUN SERPL-MCNC: 19 MG/DL (ref 7–25)
CALCIUM SERPL-MCNC: 9.3 MG/DL (ref 8.6–10.3)
CHLORIDE SERPL-SCNC: 103 MMOL/L (ref 98–110)
CHOLEST SERPL-MCNC: 170 MG/DL
CHOLEST/HDLC SERPL: 3.1 (CALC)
CO2 SERPL-SCNC: 29 MMOL/L (ref 20–32)
CREAT SERPL-MCNC: 0.87 MG/DL (ref 0.6–1.29)
EGFRCR SERPLBLD CKD-EPI 2021: 111 ML/MIN/1.73M2
EOSINOPHIL # BLD AUTO: 102 CELLS/UL (ref 15–500)
EOSINOPHIL NFR BLD AUTO: 2 %
ERYTHROCYTE [DISTWIDTH] IN BLOOD BY AUTOMATED COUNT: 14.4 % (ref 11–15)
EST. AVERAGE GLUCOSE BLD GHB EST-MCNC: 105 MG/DL
EST. AVERAGE GLUCOSE BLD GHB EST-SCNC: 5.8 MMOL/L
GLUCOSE SERPL-MCNC: 76 MG/DL (ref 65–99)
HBA1C MFR BLD: 5.3 %
HCT VFR BLD AUTO: 47.7 % (ref 38.5–50)
HDLC SERPL-MCNC: 54 MG/DL
HGB BLD-MCNC: 15 G/DL (ref 13.2–17.1)
LDLC SERPL CALC-MCNC: 102 MG/DL (CALC)
LYMPHOCYTES # BLD AUTO: 1545 CELLS/UL (ref 850–3900)
LYMPHOCYTES NFR BLD AUTO: 30.3 %
MCH RBC QN AUTO: 30.2 PG (ref 27–33)
MCHC RBC AUTO-ENTMCNC: 31.4 G/DL (ref 32–36)
MCV RBC AUTO: 96 FL (ref 80–100)
MONOCYTES # BLD AUTO: 423 CELLS/UL (ref 200–950)
MONOCYTES NFR BLD AUTO: 8.3 %
NEUTROPHILS # BLD AUTO: 2978 CELLS/UL (ref 1500–7800)
NEUTROPHILS NFR BLD AUTO: 58.4 %
NONHDLC SERPL-MCNC: 116 MG/DL (CALC)
PLATELET # BLD AUTO: 198 THOUSAND/UL (ref 140–400)
PMV BLD REES-ECKER: 10.1 FL (ref 7.5–12.5)
POTASSIUM SERPL-SCNC: 4.4 MMOL/L (ref 3.5–5.3)
PROT SERPL-MCNC: 7.1 G/DL (ref 6.1–8.1)
PSA SERPL-MCNC: 0.78 NG/ML
RBC # BLD AUTO: 4.97 MILLION/UL (ref 4.2–5.8)
SODIUM SERPL-SCNC: 141 MMOL/L (ref 135–146)
TRIGL SERPL-MCNC: 58 MG/DL
WBC # BLD AUTO: 5.1 THOUSAND/UL (ref 3.8–10.8)